# Patient Record
Sex: FEMALE | Race: BLACK OR AFRICAN AMERICAN | Employment: STUDENT | URBAN - METROPOLITAN AREA
[De-identification: names, ages, dates, MRNs, and addresses within clinical notes are randomized per-mention and may not be internally consistent; named-entity substitution may affect disease eponyms.]

---

## 2017-03-21 ENCOUNTER — HOSPITAL ENCOUNTER (EMERGENCY)
Age: 22
Discharge: HOME | End: 2017-03-21
Attending: EMERGENCY MEDICINE
Payer: COMMERCIAL

## 2017-03-21 VITALS
TEMPERATURE: 99.3 F | SYSTOLIC BLOOD PRESSURE: 100 MMHG | RESPIRATION RATE: 20 BRPM | OXYGEN SATURATION: 99 % | HEIGHT: 64 IN | DIASTOLIC BLOOD PRESSURE: 57 MMHG | HEART RATE: 103 BPM | WEIGHT: 120 LBS | BODY MASS INDEX: 20.49 KG/M2

## 2017-03-21 DIAGNOSIS — R50.9 FEVER, UNSPECIFIED FEVER CAUSE: ICD-10-CM

## 2017-03-21 DIAGNOSIS — J10.1 INFLUENZA A: Primary | ICD-10-CM

## 2017-03-21 PROCEDURE — 99281 EMR DPT VST MAYX REQ PHY/QHP: CPT

## 2017-03-21 RX ORDER — OSELTAMIVIR PHOSPHATE 75 MG/1
75 CAPSULE ORAL 2 TIMES DAILY
Qty: 10 CAP | Refills: 0 | Status: SHIPPED | OUTPATIENT
Start: 2017-03-21 | End: 2017-03-26

## 2017-03-21 RX ORDER — FLUTICASONE PROPIONATE 50 MCG
2 SPRAY, SUSPENSION (ML) NASAL DAILY
Qty: 1 BOTTLE | Refills: 0 | Status: SHIPPED | OUTPATIENT
Start: 2017-03-21

## 2017-03-21 NOTE — LETTER
NOTIFICATION RETURN TO WORK / SCHOOL 
 
3/21/2017 10:43 PM 
 
Ms. Kevin Grady [facility] [address] To Whom It May Concern: 
 
Kevin Grady is currently under the care of [id number] [facility] EMERGENCY DEPT. She will return to work/school on: 3.24.17 or sooner if no fever. If there are questions or concerns please have the patient contact our office.  
 
 
 
Sincerely, 
 
 
TARA Alanis

## 2017-03-22 NOTE — ED PROVIDER NOTES
HPI Comments: Elham West is a 20 y.o. female that presents to the ED with a complaint of HA, fever, body ache and nasal congestion x1 day. Pt states that she woke up with am feeling ok but soon after she developed fever and body ache and symptoms have worsened since. She has taken ibuprofen for fever with some relief. 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 No other complaints at this time    Patient is a 20 y.o. female presenting with general illness, fever, nasal congestion, and headaches. Generalized Body Aches   Associated symptoms include headaches. Pertinent negatives include no chest pain and no shortness of breath. Fever    Associated symptoms include congestion and headaches. Pertinent negatives include no chest pain, no cough and no shortness of breath. Nasal Congestion   Associated symptoms include headaches. Pertinent negatives include no chest pain and no shortness of breath. Headache    Associated symptoms include a fever. Pertinent negatives include no shortness of breath. Past Medical History:   Diagnosis Date    Sinusitis        History reviewed. No pertinent surgical history. Family History:   Problem Relation Age of Onset    Hypertension Maternal Grandmother     Cancer Paternal Grandmother        Social History     Social History    Marital status: SINGLE     Spouse name: N/A    Number of children: N/A    Years of education: N/A     Occupational History    Not on file. Social History Main Topics    Smoking status: Never Smoker    Smokeless tobacco: Not on file    Alcohol use No    Drug use: No    Sexual activity: Not on file     Other Topics Concern    Not on file     Social History Narrative         ALLERGIES: Rondec [brompheniramine-pseudoephedrin]    Review of Systems   Constitutional: Positive for fever. HENT: Positive for congestion. Respiratory: Negative for cough and shortness of breath. Cardiovascular: Negative for chest pain. Neurological: Positive for headaches. All other systems reviewed and are negative. Vitals:    03/21/17 2117   BP: 100/57   Pulse: (!) 103   Resp: 20   Temp: 99.3 °F (37.4 °C)   SpO2: 99%   Weight: 54.4 kg (120 lb)   Height: 5' 4\" (1.626 m)            Physical Exam   Constitutional: She appears well-developed and well-nourished. No distress. HENT:   Head: Normocephalic and atraumatic. Right Ear: External ear normal.   Left Ear: External ear normal.   Nose: Mucosal edema and rhinorrhea present. Eyes: Pupils are equal, round, and reactive to light. Neck: Normal range of motion. Cardiovascular: Normal rate, regular rhythm and normal heart sounds. Pulmonary/Chest: Effort normal and breath sounds normal. No respiratory distress. Musculoskeletal: Normal range of motion. Neurological: She is alert. Skin: Skin is warm and dry. Psychiatric: She has a normal mood and affect. Her behavior is normal.   Nursing note and vitals reviewed. MDM  Number of Diagnoses or Management Options  Diagnosis management comments: Impresson: influenza, fever    Plan: discharge home  Tamiflu as prescribed  Flonase daily x1 week  Increase fluids  Alternate Tylenol and motrin for pain/fever      ED Course       Procedures    Vitals:  Patient Vitals for the past 12 hrs:   Temp Pulse Resp BP SpO2   03/21/17 2117 99.3 °F (37.4 °C) (!) 103 20 100/57 99 %         Medications ordered:   Medications - No data to display      Lab findings:  No results found for this or any previous visit (from the past 12 hour(s)). X-Ray, CT or other radiology findings or impressions:  No orders to display       Progress notes, Consult notes or additional Procedure notes:       Disposition:  Diagnosis: No diagnosis found.     Disposition: discharge    Follow-up Information     None           Patient's Medications   Start Taking    No medications on file   Continue Taking    No medications on file   These Medications have changed    No medications on file   Stop Taking    HYDROCODONE-ACETAMINOPHEN (NORCO) 5-325 MG PER TABLET    Take 1 tablet by mouth every four (4) hours as needed for Pain.

## 2017-03-22 NOTE — ED TRIAGE NOTES
Patient states onset of symptoms today. States body aches, cough, fever, and nasal congestion. States taking dose of ibuprofen at 1930 this evening. States co-workers and children at her  are positive for Flu.

## 2017-03-22 NOTE — ED NOTES
I have reviewed discharge instructions with the patient. The patient verbalized understanding. Patient armband removed and shredded  Current Discharge Medication List      START taking these medications    Details   oseltamivir (TAMIFLU) 75 mg capsule Take 1 Cap by mouth two (2) times a day for 5 days. Qty: 10 Cap, Refills: 0      fluticasone (FLONASE) 50 mcg/actuation nasal spray 2 Sprays by Both Nostrils route daily.   Qty: 1 Bottle, Refills: 0